# Patient Record
Sex: MALE | Race: WHITE | NOT HISPANIC OR LATINO | Employment: FULL TIME | URBAN - NONMETROPOLITAN AREA
[De-identification: names, ages, dates, MRNs, and addresses within clinical notes are randomized per-mention and may not be internally consistent; named-entity substitution may affect disease eponyms.]

---

## 2023-10-02 ENCOUNTER — OFFICE VISIT (OUTPATIENT)
Dept: SURGERY | Facility: CLINIC | Age: 38
End: 2023-10-02
Payer: COMMERCIAL

## 2023-10-02 VITALS
DIASTOLIC BLOOD PRESSURE: 82 MMHG | RESPIRATION RATE: 16 BRPM | HEART RATE: 72 BPM | HEIGHT: 68 IN | BODY MASS INDEX: 42.89 KG/M2 | SYSTOLIC BLOOD PRESSURE: 120 MMHG | WEIGHT: 283 LBS

## 2023-10-02 DIAGNOSIS — Z72.0 TOBACCO USE: ICD-10-CM

## 2023-10-02 DIAGNOSIS — R14.0 BLOATING: ICD-10-CM

## 2023-10-02 DIAGNOSIS — R10.13 EPIGASTRIC PAIN: Primary | ICD-10-CM

## 2023-10-02 DIAGNOSIS — R11.0 NAUSEA: ICD-10-CM

## 2023-10-02 DIAGNOSIS — R10.11 RUQ PAIN: ICD-10-CM

## 2023-10-02 PROCEDURE — 1160F RVW MEDS BY RX/DR IN RCRD: CPT | Performed by: SURGERY

## 2023-10-02 PROCEDURE — 1159F MED LIST DOCD IN RCRD: CPT | Performed by: SURGERY

## 2023-10-02 PROCEDURE — 99203 OFFICE O/P NEW LOW 30 MIN: CPT | Performed by: SURGERY

## 2023-10-02 NOTE — H&P
Armand Yaolivan 37 y.o. male presents @ the req of JOSH Cordero for eval of RUQ pain X 6 mos that has increased, epi pain, a lot of bloating, diarrhea occ, heartburn, N/V.  Pt reports he drinks 3/4 of large bottle of whiskey every day.   Chief Complaint   Patient presents with    Abdominal Pain             HPI   Above note and agree.  This very pleasant 37-year-old male has had a 6-month history of abdominal pain.  It started as right upper quadrant pain that would radiate around.  Now has severe epigastric pain and severe heartburn.  He also has a lot of bloating and nausea.  He does admit to drinking a large amount of whiskey and using 2 cans of chew daily.  He is slowly cutting back on both.  He is now down to about 3 dips a day.  He is weaning his alcohol intake as well.  If he drinks too little he will get the shakes.      Review of Systems   All other systems reviewed and are negative.          Current Outpatient Medications:     albuterol sulfate  (90 Base) MCG/ACT inhaler, Inhale 2 puffs Every 4 (Four) Hours As Needed for Wheezing., Disp: , Rfl:     fluticasone (FLONASE) 50 MCG/ACT nasal spray, 2 sprays into the nostril(s) as directed by provider Daily., Disp: , Rfl:     Testosterone 100 MG pellet, by Implant route., Disp: , Rfl:         Allergies   Allergen Reactions    Antihistamines, Diphenhydramine-Type Unknown - High Severity    Antihistamines, Loratadine-Type Unknown - High Severity    Benzodiazepines Unknown - High Severity    Diazepam Unknown - High Severity    Morphine Unknown - High Severity    Tramadol Unknown - High Severity           Past Medical History:   Diagnosis Date    Alcoholic     Asthma     Sleep apnea            Past Surgical History:   Procedure Laterality Date    HAND SURGERY             Social History     Tobacco Use    Smoking status: Former     Types: Cigarettes    Smokeless tobacco: Current   Substance Use Topics    Alcohol use: Yes             There is no immunization  "history on file for this patient.        Physical Exam  Vitals and nursing note reviewed.   Constitutional:       Appearance: Normal appearance.   HENT:      Head: Normocephalic and atraumatic.   Cardiovascular:      Rate and Rhythm: Normal rate and regular rhythm.   Pulmonary:      Effort: Pulmonary effort is normal.      Breath sounds: Normal breath sounds.   Abdominal:      General: Bowel sounds are normal.      Palpations: Abdomen is soft.   Musculoskeletal:         General: No swelling or tenderness.   Skin:     General: Skin is warm and dry.   Neurological:      General: No focal deficit present.      Mental Status: He is alert and oriented to person, place, and time.   Psychiatric:         Mood and Affect: Mood normal.         Behavior: Behavior normal.       Debilities/Disabilities Identified: None    Emotional Behavior: Appropriate      /82   Pulse 72   Resp 16   Ht 172.7 cm (68\")   Wt 128 kg (283 lb)   BMI 43.03 kg/m²         Diagnoses and all orders for this visit:    1. Epigastric pain (Primary)    2. RUQ pain    3. Bloating    4. Nausea    We had a long discussion regarding his alcohol use and tobacco use.  We will get him scheduled for EGD and gallbladder ultrasound.  I discussed with the patient the benefits and risks of performing endoscopy.  Benefits and risks were not limited to but including bleeding, infection, perforation, complications of anesthesia, aspiration.  The patient appeared to understand and is willing to proceed.    Thank you for allowing me to participate in the care of this interesting patient.          "

## 2023-10-02 NOTE — LETTER
October 2, 2023       No Recipients    Patient: Armand Fishman   YOB: 1985   Date of Visit: 10/2/2023     Dear JOSH Freeman:       Thank you for referring Armand Fishman to me for evaluation. Below are the relevant portions of my assessment and plan of care.    If you have questions, please do not hesitate to call me. I look forward to following Armand along with you.         Sincerely,        Ivon David DO        CC:   No Recipients    Ivon David DO  10/02/23 0851  Sign when Signing Visit  Armand Fishman 37 y.o. male presents @ the req of JOSH Cordero for eval of RUQ pain X 6 mos that has increased, epi pain, a lot of bloating, diarrhea occ, heartburn, N/V.  Pt reports he drinks 3/4 of large bottle of whiskey every day.   Chief Complaint   Patient presents with   • Abdominal Pain             HPI   Above note and agree.  This very pleasant 37-year-old male has had a 6-month history of abdominal pain.  It started as right upper quadrant pain that would radiate around.  Now has severe epigastric pain and severe heartburn.  He also has a lot of bloating and nausea.  He does admit to drinking a large amount of whiskey and using 2 cans of chew daily.  He is slowly cutting back on both.  He is now down to about 3 dips a day.  He is weaning his alcohol intake as well.  If he drinks too little he will get the shakes.      Review of Systems   All other systems reviewed and are negative.          Current Outpatient Medications:   •  albuterol sulfate  (90 Base) MCG/ACT inhaler, Inhale 2 puffs Every 4 (Four) Hours As Needed for Wheezing., Disp: , Rfl:   •  fluticasone (FLONASE) 50 MCG/ACT nasal spray, 2 sprays into the nostril(s) as directed by provider Daily., Disp: , Rfl:   •  Testosterone 100 MG pellet, by Implant route., Disp: , Rfl:         Allergies   Allergen Reactions   • Antihistamines, Diphenhydramine-Type Unknown - High Severity   • Antihistamines,  "Loratadine-Type Unknown - High Severity   • Benzodiazepines Unknown - High Severity   • Diazepam Unknown - High Severity   • Morphine Unknown - High Severity   • Tramadol Unknown - High Severity           Past Medical History:   Diagnosis Date   • Alcoholic    • Asthma    • Sleep apnea            Past Surgical History:   Procedure Laterality Date   • HAND SURGERY             Social History     Tobacco Use   • Smoking status: Former     Types: Cigarettes   • Smokeless tobacco: Current   Substance Use Topics   • Alcohol use: Yes             There is no immunization history on file for this patient.        Physical Exam  Vitals and nursing note reviewed.   Constitutional:       Appearance: Normal appearance.   HENT:      Head: Normocephalic and atraumatic.   Cardiovascular:      Rate and Rhythm: Normal rate and regular rhythm.   Pulmonary:      Effort: Pulmonary effort is normal.      Breath sounds: Normal breath sounds.   Abdominal:      General: Bowel sounds are normal.      Palpations: Abdomen is soft.   Musculoskeletal:         General: No swelling or tenderness.   Skin:     General: Skin is warm and dry.   Neurological:      General: No focal deficit present.      Mental Status: He is alert and oriented to person, place, and time.   Psychiatric:         Mood and Affect: Mood normal.         Behavior: Behavior normal.       Debilities/Disabilities Identified: None    Emotional Behavior: Appropriate      /82   Pulse 72   Resp 16   Ht 172.7 cm (68\")   Wt 128 kg (283 lb)   BMI 43.03 kg/m²         Diagnoses and all orders for this visit:    1. Epigastric pain (Primary)    2. RUQ pain    3. Bloating    4. Nausea    We had a long discussion regarding his alcohol use and tobacco use.  We will get him scheduled for EGD and gallbladder ultrasound.  I discussed with the patient the benefits and risks of performing endoscopy.  Benefits and risks were not limited to but including bleeding, infection, perforation, " complications of anesthesia, aspiration.  The patient appeared to understand and is willing to proceed.    Thank you for allowing me to participate in the care of this interesting patient.

## 2023-10-04 DIAGNOSIS — R14.0 BLOATING: ICD-10-CM

## 2023-10-04 DIAGNOSIS — R10.11 RUQ PAIN: ICD-10-CM

## 2023-10-04 DIAGNOSIS — R11.0 NAUSEA: ICD-10-CM

## 2023-10-04 DIAGNOSIS — R10.13 EPIGASTRIC PAIN: Primary | ICD-10-CM

## 2023-11-08 ENCOUNTER — TELEPHONE (OUTPATIENT)
Dept: SURGERY | Facility: CLINIC | Age: 38
End: 2023-11-08
Payer: COMMERCIAL

## 2023-11-08 ENCOUNTER — PREP FOR SURGERY (OUTPATIENT)
Dept: OTHER | Facility: HOSPITAL | Age: 38
End: 2023-11-08
Payer: COMMERCIAL

## 2023-11-08 DIAGNOSIS — R14.0 BLOATING: ICD-10-CM

## 2023-11-08 DIAGNOSIS — R10.11 RUQ ABDOMINAL PAIN: Primary | ICD-10-CM

## 2023-11-20 ENCOUNTER — ANESTHESIA EVENT (OUTPATIENT)
Dept: PERIOP | Facility: HOSPITAL | Age: 38
End: 2023-11-20
Payer: COMMERCIAL

## 2023-11-21 ENCOUNTER — ANESTHESIA (OUTPATIENT)
Dept: PERIOP | Facility: HOSPITAL | Age: 38
End: 2023-11-21
Payer: COMMERCIAL

## 2023-11-21 ENCOUNTER — HOSPITAL ENCOUNTER (OUTPATIENT)
Facility: HOSPITAL | Age: 38
Setting detail: HOSPITAL OUTPATIENT SURGERY
Discharge: HOME | End: 2023-11-21
Attending: SURGERY | Admitting: SURGERY
Payer: COMMERCIAL

## 2023-11-21 VITALS
SYSTOLIC BLOOD PRESSURE: 138 MMHG | DIASTOLIC BLOOD PRESSURE: 98 MMHG | HEIGHT: 68 IN | BODY MASS INDEX: 43.19 KG/M2 | OXYGEN SATURATION: 95 % | WEIGHT: 285 LBS | RESPIRATION RATE: 16 BRPM | TEMPERATURE: 97.6 F | HEART RATE: 77 BPM

## 2023-11-21 DIAGNOSIS — R14.0 BLOATING: ICD-10-CM

## 2023-11-21 DIAGNOSIS — R10.11 RUQ ABDOMINAL PAIN: ICD-10-CM

## 2023-11-21 PROCEDURE — 88305 TISSUE EXAM BY PATHOLOGIST: CPT | Performed by: SURGERY

## 2023-11-21 PROCEDURE — 25010000002 PROPOFOL 200 MG/20ML EMULSION: Performed by: NURSE ANESTHETIST, CERTIFIED REGISTERED

## 2023-11-21 PROCEDURE — 87081 CULTURE SCREEN ONLY: CPT | Performed by: SURGERY

## 2023-11-21 PROCEDURE — 25810000003 LACTATED RINGERS PER 1000 ML: Performed by: NURSE ANESTHETIST, CERTIFIED REGISTERED

## 2023-11-21 RX ORDER — ONDANSETRON 2 MG/ML
4 INJECTION INTRAMUSCULAR; INTRAVENOUS ONCE AS NEEDED
Status: DISCONTINUED | OUTPATIENT
Start: 2023-11-21 | End: 2023-11-21 | Stop reason: HOSPADM

## 2023-11-21 RX ORDER — GLYCOPYRROLATE 0.2 MG/ML
INJECTION INTRAMUSCULAR; INTRAVENOUS AS NEEDED
Status: DISCONTINUED | OUTPATIENT
Start: 2023-11-21 | End: 2023-11-21 | Stop reason: SURG

## 2023-11-21 RX ORDER — SODIUM CHLORIDE 0.9 % (FLUSH) 0.9 %
10 SYRINGE (ML) INJECTION EVERY 12 HOURS SCHEDULED
Status: DISCONTINUED | OUTPATIENT
Start: 2023-11-21 | End: 2023-11-21 | Stop reason: HOSPADM

## 2023-11-21 RX ORDER — SODIUM CHLORIDE, SODIUM LACTATE, POTASSIUM CHLORIDE, CALCIUM CHLORIDE 600; 310; 30; 20 MG/100ML; MG/100ML; MG/100ML; MG/100ML
9 INJECTION, SOLUTION INTRAVENOUS CONTINUOUS
Status: DISCONTINUED | OUTPATIENT
Start: 2023-11-21 | End: 2023-11-21 | Stop reason: HOSPADM

## 2023-11-21 RX ORDER — KETAMINE HYDROCHLORIDE 10 MG/ML
INJECTION INTRAMUSCULAR; INTRAVENOUS AS NEEDED
Status: DISCONTINUED | OUTPATIENT
Start: 2023-11-21 | End: 2023-11-21 | Stop reason: SURG

## 2023-11-21 RX ORDER — PROPOFOL 10 MG/ML
INJECTION, EMULSION INTRAVENOUS AS NEEDED
Status: DISCONTINUED | OUTPATIENT
Start: 2023-11-21 | End: 2023-11-21 | Stop reason: SURG

## 2023-11-21 RX ORDER — LIDOCAINE HYDROCHLORIDE 20 MG/ML
INJECTION, SOLUTION INFILTRATION; PERINEURAL AS NEEDED
Status: DISCONTINUED | OUTPATIENT
Start: 2023-11-21 | End: 2023-11-21 | Stop reason: SURG

## 2023-11-21 RX ORDER — SODIUM CHLORIDE 9 MG/ML
40 INJECTION, SOLUTION INTRAVENOUS AS NEEDED
Status: DISCONTINUED | OUTPATIENT
Start: 2023-11-21 | End: 2023-11-21 | Stop reason: HOSPADM

## 2023-11-21 RX ORDER — SODIUM CHLORIDE, SODIUM LACTATE, POTASSIUM CHLORIDE, CALCIUM CHLORIDE 600; 310; 30; 20 MG/100ML; MG/100ML; MG/100ML; MG/100ML
100 INJECTION, SOLUTION INTRAVENOUS CONTINUOUS
Status: DISCONTINUED | OUTPATIENT
Start: 2023-11-21 | End: 2023-11-21 | Stop reason: HOSPADM

## 2023-11-21 RX ORDER — SODIUM CHLORIDE 0.9 % (FLUSH) 0.9 %
10 SYRINGE (ML) INJECTION AS NEEDED
Status: DISCONTINUED | OUTPATIENT
Start: 2023-11-21 | End: 2023-11-21 | Stop reason: HOSPADM

## 2023-11-21 RX ORDER — LIDOCAINE HYDROCHLORIDE 10 MG/ML
0.5 INJECTION, SOLUTION INFILTRATION; PERINEURAL ONCE AS NEEDED
Status: DISCONTINUED | OUTPATIENT
Start: 2023-11-21 | End: 2023-11-21 | Stop reason: HOSPADM

## 2023-11-21 RX ORDER — OMEPRAZOLE 40 MG/1
40 CAPSULE, DELAYED RELEASE ORAL DAILY
Qty: 30 CAPSULE | Refills: 6 | Status: SHIPPED | OUTPATIENT
Start: 2023-11-21

## 2023-11-21 RX ADMIN — PROPOFOL INJECTABLE EMULSION 60 MG: 10 INJECTION, EMULSION INTRAVENOUS at 10:54

## 2023-11-21 RX ADMIN — SODIUM CHLORIDE, POTASSIUM CHLORIDE, SODIUM LACTATE AND CALCIUM CHLORIDE 9 ML/HR: 600; 310; 30; 20 INJECTION, SOLUTION INTRAVENOUS at 09:57

## 2023-11-21 RX ADMIN — PROPOFOL INJECTABLE EMULSION 50 MG: 10 INJECTION, EMULSION INTRAVENOUS at 10:57

## 2023-11-21 RX ADMIN — LIDOCAINE HYDROCHLORIDE 100 MG: 20 INJECTION, SOLUTION INFILTRATION; PERINEURAL at 10:54

## 2023-11-21 RX ADMIN — PROPOFOL INJECTABLE EMULSION 50 MG: 10 INJECTION, EMULSION INTRAVENOUS at 10:59

## 2023-11-21 RX ADMIN — KETAMINE HYDROCHLORIDE 20 MG: 10 INJECTION INTRAMUSCULAR; INTRAVENOUS at 10:54

## 2023-11-21 RX ADMIN — PROPOFOL INJECTABLE EMULSION 50 MG: 10 INJECTION, EMULSION INTRAVENOUS at 11:02

## 2023-11-21 RX ADMIN — GLYCOPYRROLATE 0.1 MG: 0.2 INJECTION INTRAMUSCULAR; INTRAVENOUS at 10:54

## 2023-11-21 NOTE — H&P
Armand Yaolivan 37 y.o. male presents @ the req of JOSH Cordero for eval of RUQ pain X 6 mos that has increased, epi pain, a lot of bloating, diarrhea occ, heartburn, N/V.  Pt reports he drinks 3/4 of large bottle of whiskey every day.       Chief Complaint   Patient presents with    Abdominal Pain                  HPI   Above note and agree.  This very pleasant 37-year-old male has had a 6-month history of abdominal pain.  It started as right upper quadrant pain that would radiate around.  Now has severe epigastric pain and severe heartburn.  He also has a lot of bloating and nausea.  He does admit to drinking a large amount of whiskey and using 2 cans of chew daily.  He is slowly cutting back on both.  He is now down to about 3 dips a day.  He is weaning his alcohol intake as well.  If he drinks too little he will get the shakes.        Review of Systems   All other systems reviewed and are negative.              Current Outpatient Medications:     albuterol sulfate  (90 Base) MCG/ACT inhaler, Inhale 2 puffs Every 4 (Four) Hours As Needed for Wheezing., Disp: , Rfl:     fluticasone (FLONASE) 50 MCG/ACT nasal spray, 2 sprays into the nostril(s) as directed by provider Daily., Disp: , Rfl:     Testosterone 100 MG pellet, by Implant route., Disp: , Rfl:                 Allergies   Allergen Reactions    Antihistamines, Diphenhydramine-Type Unknown - High Severity    Antihistamines, Loratadine-Type Unknown - High Severity    Benzodiazepines Unknown - High Severity    Diazepam Unknown - High Severity    Morphine Unknown - High Severity    Tramadol Unknown - High Severity                    Past Medical History:   Diagnosis Date    Alcoholic      Asthma      Sleep apnea                       Past Surgical History:   Procedure Laterality Date    HAND SURGERY                   Social History            Tobacco Use    Smoking status: Former       Types: Cigarettes    Smokeless tobacco: Current   Substance Use  "Topics    Alcohol use: Yes                  There is no immunization history on file for this patient.           Physical Exam  Vitals and nursing note reviewed.   Constitutional:       Appearance: Normal appearance.   HENT:      Head: Normocephalic and atraumatic.   Cardiovascular:      Rate and Rhythm: Normal rate and regular rhythm.   Pulmonary:      Effort: Pulmonary effort is normal.      Breath sounds: Normal breath sounds.   Abdominal:      General: Bowel sounds are normal.      Palpations: Abdomen is soft.   Musculoskeletal:         General: No swelling or tenderness.   Skin:     General: Skin is warm and dry.   Neurological:      General: No focal deficit present.      Mental Status: He is alert and oriented to person, place, and time.   Psychiatric:         Mood and Affect: Mood normal.         Behavior: Behavior normal.         Debilities/Disabilities Identified: None     Emotional Behavior: Appropriate        /82   Pulse 72   Resp 16   Ht 172.7 cm (68\")   Wt 128 kg (283 lb)   BMI 43.03 kg/m²            Diagnoses and all orders for this visit:     1. Epigastric pain (Primary)     2. RUQ pain     3. Bloating     4. Nausea     We had a long discussion regarding his alcohol use and tobacco use.  We will get him scheduled for EGD and gallbladder ultrasound.  I discussed with the patient the benefits and risks of performing endoscopy.  Benefits and risks were not limited to but including bleeding, infection, perforation, complications of anesthesia, aspiration.  The patient appeared to understand and is willing to proceed.  "

## 2023-11-21 NOTE — OP NOTE
EGD Procedure Note    Pre-operative Diagnosis: Right upper quadrant pain, bloating    Post-operative Diagnosis: Gastritis and duodenitis    Procedure:  EGD with biopsies with cold forceps    Surgeon: Joann    Estimated Blood Loss: Minimal    Complications: None    Anesthetic: MAC Jo Rice CRNA    Indications: See preoperative diagnosis    Findings/Treatments:    Gastritis-biopsy for H. pylori with cold forceps  Duodenitis-biopsies with cold forceps    Recommendations:  -Await pathology.      Technique:    After discussing the benefits and risks of an EGD, benefits and risks not limited to but including:  Bleeding, infection, perforation, aspiration; informed consent was signed.  The patient was taken into the endoscopy suite at [facility] and placed in the left lateral decubitus position.  MAC anesthesia was induced under appropriate monitoring.  Bite block was placed and the gastroscope was inserted thru such and advanced under direct vision to second portion of the duodenum.  A careful inspection was made as the gastroscope was withdrawn, including a retroflexed view of the proximal stomach; findings and interventions are described below.  If biopsies were taken, this was done with the cold biopsy forceps.    Ivon David D.O.

## 2023-11-21 NOTE — ANESTHESIA PREPROCEDURE EVALUATION
Anesthesia Evaluation     Patient summary reviewed and Nursing notes reviewed   no history of anesthetic complications:   NPO Solid Status: > 8 hours  NPO Liquid Status: > 8 hours           Airway   Mallampati: II  TM distance: >3 FB  Neck ROM: full  No difficulty expected  Dental    (+) edentulous, upper dentures and lower dentures    Pulmonary - normal exam    breath sounds clear to auscultation  (+) a smoker Current Smoked day of surgery, asthma (daily),sleep apnea on CPAP  Cardiovascular - normal exam  Exercise tolerance: good (4-7 METS)    Rhythm: regular  Rate: normal    (+) hyperlipidemia      Neuro/Psych  (+) psychiatric history Anxiety  GI/Hepatic/Renal/Endo    (+) obesity, morbid obesity, GERD well controlled, liver disease fatty liver disease    Musculoskeletal     (+) back pain  Abdominal   (+) obese   Substance History - negative use     OB/GYN negative ob/gyn ROS         Other   blood dyscrasia,                 Anesthesia Plan    ASA 3     MAC     intravenous induction     Anesthetic plan, risks, benefits, and alternatives have been provided, discussed and informed consent has been obtained with: patient.  Pre-procedure education provided  Use of blood products discussed with patient  Consented to blood products.      CODE STATUS:

## 2023-11-21 NOTE — ANESTHESIA POSTPROCEDURE EVALUATION
Patient: Armand Fishman    Procedure Summary       Date: 11/21/23 Room / Location: [facility] ENDOSCOPY 1 /  [facility]    Anesthesia Start: 1050 Anesthesia Stop: 1106    Procedure: ESOPHAGOGASTRODUODENOSCOPY WITH BIOPSY (Esophagus) Diagnosis:       RUQ abdominal pain      Bloating      (RUQ abdominal pain [R10.11])      (Bloating [R14.0])    Surgeons: Ivon David DO Provider: Jo Christopher CRNA    Anesthesia Type: MAC ASA Status: 3            Anesthesia Type: MAC    Vitals  Vitals Value Taken Time   /76 11/21/23 1120   Temp 97.6 °F (36.4 °C) 11/21/23 1111   Pulse 78 11/21/23 1126   Resp 16 11/21/23 1120   SpO2 95 % 11/21/23 1124   Vitals shown include unfiled device data.        Post Anesthesia Care and Evaluation    Patient location during evaluation: bedside  Patient participation: complete - patient participated  Level of consciousness: awake and alert  Pain score: 0  Pain management: adequate    Airway patency: patent  Anesthetic complications: No anesthetic complications  PONV Status: none  Cardiovascular status: acceptable  Respiratory status: acceptable  Hydration status: acceptable  No anesthesia care post op

## 2023-11-21 NOTE — DISCHARGE INSTRUCTIONS
Upper Endoscopy, Adult, Care After  This sheet gives you information about how to care for yourself after your procedure. Your health care provider may also give you more specific instructions. If you have problems or questions, contact your health care provider.  What can I expect after the procedure?  After the procedure, it is common to have:  A sore throat.  Mild stomach pain or discomfort.  Bloating.  Nausea.  Follow these instructions at home:       Follow instructions from your health care provider about what to eat or drink after your procedure.  Return to your normal activities as told by your health care provider. Ask your health care provider what activities are safe for you.  Take over-the-counter and prescription medicines only as told by your health care provider.  DO NOT DRIVE FOR THE REST OF TODAY if you were given a sedative during your procedure.  Keep all follow-up visits as told by your health care provider. This is important.  Contact a health care provider if you have:  A sore throat that lasts longer than one day.  Trouble swallowing.  Get help right away if:  You vomit blood or your vomit looks like coffee grounds.  You have:  A fever.  Bloody, black, or tarry stools.  A severe sore throat or you cannot swallow.  Difficulty breathing.  Severe pain in your chest or abdomen.  Summary  After the procedure, it is common to have a sore throat, mild stomach discomfort, bloating, and nausea.  Do not drive TODAY if you were given a sedative during the procedure.  Follow instructions from your health care provider about what to eat or drink after your procedure.  Return to your normal activities as told by your health care provider.  This information is not intended to replace advice given to you by your health care provider. Make sure you discuss any questions you have with your health care provider.  Document Released: 06/18/2013 Document Revised: 05/20/2019 Document Reviewed: 05/20/2019  [employer]  Interactive Patient Education © 2019 [employer]

## 2023-11-22 LAB
LAB AP CASE REPORT: NORMAL
PATH REPORT.FINAL DX SPEC: NORMAL
PATH REPORT.GROSS SPEC: NORMAL
UREASE TISS QL: NEGATIVE

## 2023-11-24 NOTE — PROGRESS NOTES
Please get Armand scheduled for a swallow study and refer him to ENT somewhere in [facility]

## 2023-11-28 DIAGNOSIS — R13.10 DYSPHAGIA, UNSPECIFIED TYPE: Primary | ICD-10-CM

## 2023-11-28 DIAGNOSIS — R10.13 EPIGASTRIC PAIN: ICD-10-CM

## 2023-11-28 DIAGNOSIS — R11.0 NAUSEA: ICD-10-CM

## (undated) DEVICE — Device

## (undated) DEVICE — FRCP BX RADJAW4 NDL 2.8 240CM LG OG BX40

## (undated) DEVICE — LAB CORP AGAR SLANT UREA PK/10

## (undated) DEVICE — SOL IRR H2O BTL 1000ML STRL

## (undated) DEVICE — VIAL FORMALIN CAP 10P 40ML

## (undated) DEVICE — KT ORCA ORCAPOD DISP STRL

## (undated) DEVICE — BW-412T DISP COMBO CLEANING BRUSH: Brand: SINGLE USE COMBINATION CLEANING BRUSH

## (undated) DEVICE — LINER SURG CANSTR SXN S/RIGD 1500CC

## (undated) DEVICE — THE BITE BLOCK MAXI, LATEX FREE STRAP IS USED TO PROTECT THE ENDOSCOPE INSERTION TUBE FROM BEING BITTEN BY THE PATIENT.

## (undated) DEVICE — ADAPT CLN BIOGUARD AIR/H2O DISP

## (undated) DEVICE — SYR LL 3CC